# Patient Record
Sex: MALE | Race: WHITE | Employment: FULL TIME | URBAN - METROPOLITAN AREA
[De-identification: names, ages, dates, MRNs, and addresses within clinical notes are randomized per-mention and may not be internally consistent; named-entity substitution may affect disease eponyms.]

---

## 2018-07-02 PROBLEM — R07.9 CHEST PAIN: Status: ACTIVE | Noted: 2018-07-02

## 2019-01-24 ENCOUNTER — HOSPITAL ENCOUNTER (EMERGENCY)
Age: 54
Discharge: HOME | End: 2019-01-24
Payer: COMMERCIAL

## 2019-01-24 ENCOUNTER — APPOINTMENT (OUTPATIENT)
Dept: GENERAL RADIOLOGY | Age: 54
End: 2019-01-24
Payer: COMMERCIAL

## 2019-01-24 VITALS
BODY MASS INDEX: 32.81 KG/M2 | RESPIRATION RATE: 14 BRPM | HEIGHT: 75 IN | OXYGEN SATURATION: 97 % | HEART RATE: 60 BPM | WEIGHT: 263.89 LBS | DIASTOLIC BLOOD PRESSURE: 74 MMHG | TEMPERATURE: 98.4 F | SYSTOLIC BLOOD PRESSURE: 110 MMHG
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
## 2019-01-24 DIAGNOSIS — S20.211A CONTUSION OF RIGHT CHEST WALL, INITIAL ENCOUNTER: Primary | ICD-10-CM

## 2019-01-24 PROCEDURE — 99283 EMERGENCY DEPT VISIT LOW MDM: CPT

## 2019-01-24 PROCEDURE — 71046 X-RAY EXAM CHEST 2 VIEWS: CPT

## 2019-01-24 RX ORDER — TRAMADOL HYDROCHLORIDE 50 MG/1
50 TABLET ORAL
Status: ON HOLD | COMMUNITY
Start: 2018-11-01 | End: 2021-10-25 | Stop reason: ALTCHOICE

## 2019-01-24 RX ORDER — OXYCODONE HYDROCHLORIDE AND ACETAMINOPHEN 5; 325 MG/1; MG/1
1 TABLET ORAL EVERY 6 HOURS PRN
Qty: 12 TABLET | Refills: 0 | Status: SHIPPED | OUTPATIENT
Start: 2019-01-24 | End: 2019-01-27

## 2019-01-24 ASSESSMENT — ENCOUNTER SYMPTOMS
ABDOMINAL PAIN: 0
VOMITING: 0
NAUSEA: 0
SHORTNESS OF BREATH: 0
BACK PAIN: 0
SORE THROAT: 0

## 2019-01-24 ASSESSMENT — PAIN DESCRIPTION - PAIN TYPE: TYPE: ACUTE PAIN

## 2019-01-24 ASSESSMENT — PAIN DESCRIPTION - FREQUENCY: FREQUENCY: INTERMITTENT

## 2019-01-24 ASSESSMENT — PAIN DESCRIPTION - ORIENTATION: ORIENTATION: ANTERIOR;RIGHT

## 2019-01-24 ASSESSMENT — PAIN DESCRIPTION - LOCATION: LOCATION: RIB CAGE

## 2019-01-24 ASSESSMENT — PAIN SCALES - GENERAL: PAINLEVEL_OUTOF10: 2

## 2021-10-24 ENCOUNTER — APPOINTMENT (OUTPATIENT)
Dept: CT IMAGING | Age: 56
End: 2021-10-24
Payer: COMMERCIAL

## 2021-10-24 ENCOUNTER — HOSPITAL ENCOUNTER (OUTPATIENT)
Age: 56
Setting detail: OBSERVATION
Discharge: HOME | End: 2021-10-25
Attending: EMERGENCY MEDICINE | Admitting: INTERNAL MEDICINE
Payer: COMMERCIAL

## 2021-10-24 DIAGNOSIS — G45.9 TIA (TRANSIENT ISCHEMIC ATTACK): Primary | ICD-10-CM

## 2021-10-24 LAB
ANION GAP SERPL CALCULATED.3IONS-SCNC: 7 MMOL/L (ref 3–16)
BASOPHILS ABSOLUTE: 0.1 K/UL (ref 0–0.2)
BASOPHILS RELATIVE PERCENT: 0.7 %
BUN BLDV-MCNC: 9 MG/DL (ref 7–20)
CALCIUM SERPL-MCNC: 7.7 MG/DL (ref 8.3–10.6)
CHLORIDE BLD-SCNC: 108 MMOL/L (ref 99–110)
CO2: 24 MMOL/L (ref 21–32)
CREAT SERPL-MCNC: 0.8 MG/DL (ref 0.9–1.3)
EOSINOPHILS ABSOLUTE: 0.1 K/UL (ref 0–0.6)
EOSINOPHILS RELATIVE PERCENT: 1.1 %
GFR AFRICAN AMERICAN: >60
GFR NON-AFRICAN AMERICAN: >60
GLUCOSE BLD-MCNC: 89 MG/DL (ref 70–99)
HCT VFR BLD CALC: 42.1 % (ref 40.5–52.5)
HEMOGLOBIN: 14 G/DL (ref 13.5–17.5)
LYMPHOCYTES ABSOLUTE: 2.1 K/UL (ref 1–5.1)
LYMPHOCYTES RELATIVE PERCENT: 27.4 %
MAGNESIUM: 1.7 MG/DL (ref 1.8–2.4)
MCH RBC QN AUTO: 31.8 PG (ref 26–34)
MCHC RBC AUTO-ENTMCNC: 33.2 G/DL (ref 31–36)
MCV RBC AUTO: 95.9 FL (ref 80–100)
MONOCYTES ABSOLUTE: 0.8 K/UL (ref 0–1.3)
MONOCYTES RELATIVE PERCENT: 10.2 %
NEUTROPHILS ABSOLUTE: 4.7 K/UL (ref 1.7–7.7)
NEUTROPHILS RELATIVE PERCENT: 60.6 %
PDW BLD-RTO: 13.1 % (ref 12.4–15.4)
PLATELET # BLD: 262 K/UL (ref 135–450)
PMV BLD AUTO: 7 FL (ref 5–10.5)
POTASSIUM REFLEX MAGNESIUM: 3.4 MMOL/L (ref 3.5–5.1)
RBC # BLD: 4.39 M/UL (ref 4.2–5.9)
SODIUM BLD-SCNC: 139 MMOL/L (ref 136–145)
WBC # BLD: 7.8 K/UL (ref 4–11)

## 2021-10-24 PROCEDURE — 6370000000 HC RX 637 (ALT 250 FOR IP): Performed by: INTERNAL MEDICINE

## 2021-10-24 PROCEDURE — 70496 CT ANGIOGRAPHY HEAD: CPT

## 2021-10-24 PROCEDURE — 85025 COMPLETE CBC W/AUTO DIFF WBC: CPT

## 2021-10-24 PROCEDURE — 70450 CT HEAD/BRAIN W/O DYE: CPT

## 2021-10-24 PROCEDURE — 80048 BASIC METABOLIC PNL TOTAL CA: CPT

## 2021-10-24 PROCEDURE — 83735 ASSAY OF MAGNESIUM: CPT

## 2021-10-24 PROCEDURE — 6360000004 HC RX CONTRAST MEDICATION: Performed by: EMERGENCY MEDICINE

## 2021-10-24 PROCEDURE — 93005 ELECTROCARDIOGRAM TRACING: CPT | Performed by: PHYSICIAN ASSISTANT

## 2021-10-24 PROCEDURE — 36415 COLL VENOUS BLD VENIPUNCTURE: CPT

## 2021-10-24 PROCEDURE — 99284 EMERGENCY DEPT VISIT MOD MDM: CPT

## 2021-10-24 PROCEDURE — 6370000000 HC RX 637 (ALT 250 FOR IP): Performed by: PHYSICIAN ASSISTANT

## 2021-10-24 PROCEDURE — G0378 HOSPITAL OBSERVATION PER HR: HCPCS

## 2021-10-24 PROCEDURE — 2580000003 HC RX 258: Performed by: INTERNAL MEDICINE

## 2021-10-24 RX ORDER — SODIUM CHLORIDE 0.9 % (FLUSH) 0.9 %
10 SYRINGE (ML) INJECTION EVERY 12 HOURS SCHEDULED
Status: DISCONTINUED | OUTPATIENT
Start: 2021-10-24 | End: 2021-10-25 | Stop reason: HOSPADM

## 2021-10-24 RX ORDER — ASPIRIN 81 MG/1
81 TABLET ORAL DAILY
Status: DISCONTINUED | OUTPATIENT
Start: 2021-10-24 | End: 2021-10-25 | Stop reason: HOSPADM

## 2021-10-24 RX ORDER — ATORVASTATIN CALCIUM 40 MG/1
40 TABLET, FILM COATED ORAL NIGHTLY
Status: DISCONTINUED | OUTPATIENT
Start: 2021-10-24 | End: 2021-10-25 | Stop reason: HOSPADM

## 2021-10-24 RX ORDER — MAGNESIUM SULFATE 1 G/100ML
1000 INJECTION INTRAVENOUS PRN
Status: DISCONTINUED | OUTPATIENT
Start: 2021-10-24 | End: 2021-10-25 | Stop reason: HOSPADM

## 2021-10-24 RX ORDER — POTASSIUM CHLORIDE 7.45 MG/ML
10 INJECTION INTRAVENOUS PRN
Status: DISCONTINUED | OUTPATIENT
Start: 2021-10-24 | End: 2021-10-25 | Stop reason: HOSPADM

## 2021-10-24 RX ORDER — ACETAMINOPHEN 325 MG/1
650 TABLET ORAL ONCE
Status: COMPLETED | OUTPATIENT
Start: 2021-10-24 | End: 2021-10-24

## 2021-10-24 RX ORDER — SODIUM CHLORIDE 9 MG/ML
25 INJECTION, SOLUTION INTRAVENOUS PRN
Status: DISCONTINUED | OUTPATIENT
Start: 2021-10-24 | End: 2021-10-25 | Stop reason: HOSPADM

## 2021-10-24 RX ORDER — GLUCOSAMINE/D3/BOSWELLIA SERRA 1500MG-400
10000 TABLET ORAL DAILY
Status: DISCONTINUED | OUTPATIENT
Start: 2021-10-24 | End: 2021-10-24 | Stop reason: RX

## 2021-10-24 RX ORDER — ASPIRIN 300 MG/1
300 SUPPOSITORY RECTAL DAILY
Status: DISCONTINUED | OUTPATIENT
Start: 2021-10-24 | End: 2021-10-25 | Stop reason: HOSPADM

## 2021-10-24 RX ORDER — TRAMADOL HYDROCHLORIDE 50 MG/1
50 TABLET ORAL EVERY 4 HOURS PRN
Status: DISCONTINUED | OUTPATIENT
Start: 2021-10-24 | End: 2021-10-25 | Stop reason: HOSPADM

## 2021-10-24 RX ORDER — ONDANSETRON 4 MG/1
4 TABLET, ORALLY DISINTEGRATING ORAL EVERY 8 HOURS PRN
Status: DISCONTINUED | OUTPATIENT
Start: 2021-10-24 | End: 2021-10-25 | Stop reason: HOSPADM

## 2021-10-24 RX ORDER — ONDANSETRON 2 MG/ML
4 INJECTION INTRAMUSCULAR; INTRAVENOUS EVERY 6 HOURS PRN
Status: DISCONTINUED | OUTPATIENT
Start: 2021-10-24 | End: 2021-10-25 | Stop reason: HOSPADM

## 2021-10-24 RX ORDER — CITALOPRAM 20 MG/1
20 TABLET ORAL DAILY
Status: DISCONTINUED | OUTPATIENT
Start: 2021-10-24 | End: 2021-10-25 | Stop reason: HOSPADM

## 2021-10-24 RX ORDER — M-VIT,TX,IRON,MINS/CALC/FOLIC 27MG-0.4MG
1 TABLET ORAL DAILY
Status: DISCONTINUED | OUTPATIENT
Start: 2021-10-24 | End: 2021-10-25 | Stop reason: HOSPADM

## 2021-10-24 RX ORDER — SODIUM CHLORIDE 0.9 % (FLUSH) 0.9 %
10 SYRINGE (ML) INJECTION PRN
Status: DISCONTINUED | OUTPATIENT
Start: 2021-10-24 | End: 2021-10-25 | Stop reason: HOSPADM

## 2021-10-24 RX ORDER — POTASSIUM CHLORIDE 20 MEQ/1
40 TABLET, EXTENDED RELEASE ORAL PRN
Status: DISCONTINUED | OUTPATIENT
Start: 2021-10-24 | End: 2021-10-25 | Stop reason: HOSPADM

## 2021-10-24 RX ADMIN — ATORVASTATIN CALCIUM 40 MG: 40 TABLET, FILM COATED ORAL at 21:11

## 2021-10-24 RX ADMIN — ACETAMINOPHEN 650 MG: 325 TABLET ORAL at 12:37

## 2021-10-24 RX ADMIN — SODIUM CHLORIDE, PRESERVATIVE FREE 10 ML: 5 INJECTION INTRAVENOUS at 21:11

## 2021-10-24 RX ADMIN — ASPIRIN 81 MG: 81 TABLET, COATED ORAL at 21:11

## 2021-10-24 RX ADMIN — IOPAMIDOL 75 ML: 755 INJECTION, SOLUTION INTRAVENOUS at 13:40

## 2021-10-24 ASSESSMENT — ENCOUNTER SYMPTOMS
CHEST TIGHTNESS: 0
VOMITING: 0
GASTROINTESTINAL NEGATIVE: 1
SHORTNESS OF BREATH: 0
NAUSEA: 0

## 2021-10-24 ASSESSMENT — PAIN SCALES - GENERAL
PAINLEVEL_OUTOF10: 0
PAINLEVEL_OUTOF10: 4
PAINLEVEL_OUTOF10: 0

## 2021-10-24 NOTE — ED PROVIDER NOTES
[address]  [address] [address]  Dept: [address]: [phone]  eMERGENCYdEPARTMENT eNCOUnter      Pt Name: Elayne Kramer  MRN: [id number]  Jitendra 1965  Date of evaluation: 10/24/2021  Provider:Irlanda Wen PA-C    CHIEF COMPLAINT     No chief complaint on file. CRITICAL CARE TIME   Total Critical Care time was 10 minutes, excluding separately reportable procedures. There was a high probability of clinically significant/life threatening deterioration in the patient's condition which required my urgentintervention. HISTORY OF PRESENT ILLNESS  (Location/Symptom, Timing/Onset, Context/Setting, Quality, Duration,Modifying Factors, Severity.)   Elayne Kramer is a 55 y.o. male who presents to the emergency department by private vehicle. Patient states this morning he was filling his pillbox when he notices his \"left eye was doing funky things\". This first episode lasted about 2 minutes and resolved spontaneously. Short while later he began having recurrence of symptoms. At this time he noticed his left eye had drifted laterally and he could not move it. This episode lasted about 20 minutes and resolved spontaneously prior to arrival. He denies any trauma or injury to head/eyes, extremity numbness/tingling/weakness, speech abnormality, confusion, visual field loss or diplopia. States he currently has a mild headache. Denies any nausea or vomiting. Never had symptoms like this previously. Patient also states he had 3 seizures over the past year. States wife has witnessed episodes. Patient states he loses consciousness, his eyes rolled back of his head he has fine shaking, particularly to the upper portion of his body/head. He has never been seen or evaluated for these episodes. Nursing Notes were reviewedand agreed with or any disagreements were addressed in the HPI.     REVIEW OF SYSTEMS    (2-9 systems for level 4, 10 or more for level 5)     Review of Systems   Constitutional: Negative for chills and fever. HENT: Negative. Eyes: Positive for visual disturbance. Respiratory: Negative for chest tightness and shortness of breath. Cardiovascular: Negative. Gastrointestinal: Negative. Negative for nausea and vomiting. Genitourinary: Negative. Musculoskeletal: Negative for arthralgias and myalgias. Skin: Negative. Neurological: Negative for dizziness and light-headedness. Psychiatric/Behavioral: Negative for behavioral problems and confusion. Except as noted above the remainder of the review of systems was reviewed and negative. PAST MEDICAL HISTORY         Diagnosis Date    DVT of leg (deep venous thrombosis) ([facility] [address] 2017    right    Seizures ([facility] [address]        SURGICAL HISTORY           Procedure Laterality Date    LUMBAR FUSION      TONSILLECTOMY         CURRENT MEDICATIONS     [unfilled]    ALLERGIES     Sulfa antibiotics    FAMILY HISTORY     History reviewed. No pertinent family history. No family status information on file. SOCIAL HISTORY      reports that he has never smoked. He has never used smokeless tobacco. He reports that he does not drink alcohol and does not use drugs. PHYSICAL EXAM    (up to 7 for level 4, 8 or more for level 5)     ED Triage Vitals   Enc Vitals Group      BP 10/24/21 1315 132/88      Pulse 10/24/21 1315 80      Resp 10/24/21 1315 16      Temp --       Temp src --       SpO2 10/24/21 1145 96 %      Weight 10/24/21 1030 257 lb 15 oz (117 kg)      Height 10/24/21 1030 6' 5\" (1.956 m)      Head Circumference --       Peak Flow --       Pain Score --       Pain Loc --       Pain Edu? --       Excl. in [address]? --         Physical Exam  Vitals reviewed. Constitutional:       Appearance: Normal appearance. HENT:      Head: Normocephalic and atraumatic. Cardiovascular:      Rate and Rhythm: Normal rate and regular rhythm.    Pulmonary: Effort: Pulmonary effort is normal. No respiratory distress. Abdominal:      Palpations: Abdomen is soft. Tenderness: There is no abdominal tenderness. Musculoskeletal:         General: Normal range of motion. Cervical back: Normal range of motion and neck supple. Skin:     General: Skin is warm. Neurological:      General: No focal deficit present. Mental Status: He is alert and oriented to person, place, and time. Mental status is at baseline. Cranial Nerves: No cranial nerve deficit. Sensory: No sensory deficit. Motor: No weakness. Coordination: Coordination normal.      Gait: Gait normal.   Psychiatric:         Mood and Affect: Mood normal.         Behavior: Behavior normal.           DIAGNOSTIC RESULTS     EKG: All EKG's are interpreted by the Emergency Department Physician who either signs or Co-signs this chart in the absence of a cardiologist.    RADIOLOGY:   Non-plain film images such as CT, Ultrasound and MRI are read by the radiologist. Plain radiographic images are visualized and preliminarilyinterpreted by the emergency physician with the below findings:    Interpretation per the Radiologist below,if available at the time of this note:    CT HEAD WO CONTRAST   Final Result   No acute intracranial abnormality. CTA HEAD NECK W CONTRAST   Final Result   Unremarkable CTA of the head and neck without significant stenosis or   evidence for occlusion.                LABS:  Labs Reviewed   BASIC METABOLIC PANEL W/ REFLEX TO MG FOR LOW K - Abnormal; Notable for the following components:       Result Value    Potassium reflex Magnesium 3.4 (*)     CREATININE 0.8 (*)     Calcium 7.7 (*)     All other components within normal limits    Narrative:     Performed at:  [place] [address]   Phone [phone]   MAGNESIUM - Abnormal; Notable for the following components:    Magnesium 1.70 (*) All other components within normal limits    Narrative:     Performed at:  [facility]  [address] [place] [address]   Phone [phone]   CBC WITH AUTO DIFFERENTIAL    Narrative:     Performed at:  [facility]  [address] [place], [place] [address]   Phone [phone]   PROTIME-INR       All other labs were within normal range or not returned as of this dictation. EMERGENCY DEPARTMENT COURSE and DIFFERENTIAL DIAGNOSIS/MDM:   Vitals:    Vitals:    10/24/21 1430 10/24/21 1445 10/24/21 1500 10/24/21 1515   BP:  127/80 134/72 105/62   Pulse:       Resp:       SpO2: 97% 97% 97% 100%   Weight:       Height:           MDM     Patient presents to the ED with HPI noted above. Patient asymptomatic upon arrival. CT head without contrast and CTA head and neck obtained. CT head showed no acute intracranial abnormality, CTA head and neck unremarkable. Consultation made to neurology. Dr. Greta Santiago kindly spoke with me. Patient be admitted for further CVA work-up. Patient with no recurrence of symptoms throughout course of stay in the ED. Consultation made to hospitalist regarding admission. Dr. Binh Anand kindly admitted patient. CONSULTS:  IP CONSULT TO NEUROLOGY    PROCEDURES:  Procedures    FINAL IMPRESSION      1. TIA (transient ischemic attack)          DISPOSITION/PLAN   [unfilled]    PATIENT REFERRED TO:  No follow-up provider specified.     DISCHARGE MEDICATIONS:  New Prescriptions    No medications on file       (Please note that portions of this note were completed with a voice recognition program.  Efforts were made to edit the dictations but occasionally words are mis-transcribed.)    [address] [facility]          [address] [place] [address]  10/24/21 2440

## 2021-10-24 NOTE — ED TRIAGE NOTES
pt states that earlier this morning his L eye turned to the side and it lasted 2 minutes, it then happened again but lasted for about 20 minutes. He states he was still able to see out that eye but the vision was not from what was in front of him. Wife saw his eye was turned to the side, but it then went back to normal. He now states his vision is normal and nothing is wrong. He does complain of small headache.  Neuro assessment is normal.

## 2021-10-24 NOTE — H&P
Hospital Medicine History & Physical      PCP: Sonal Brown MD    Date of Admission: 10/24/2021    Chief Complaint:  Left eye deviation    History Of Present Illness:  Patient is a 79-year-old male with past medical history of DVT on Xarelto who presented to hospital for change in vision especially from the left eye, mentions he had deviation of his left eye, he had noticed changes in vision along with difficulty maintaining balance. It was witnessed by his domestic partner as well. Patient otherwise denied numbness tingling sensation nausea vomiting diarrhea constipation. Patient mentions his symptoms have resolved now. Denies any previous similar episodes in the past        Past Medical History:          Diagnosis Date    DVT of leg (deep venous thrombosis) ([facility] [address] 2017    right    Seizures ([facility])        Past Surgical History:          Procedure Laterality Date    LUMBAR FUSION      TONSILLECTOMY         Medications Prior to Admission:      Prior to Admission medications    Medication Sig Start Date End Date Taking? Authorizing Provider   traMADol (ULTRAM) 50 MG tablet Take 50 mg by mouth. . 11/1/18   Historical Provider, MD   Multiple Vitamins-Minerals (THERAPEUTIC MULTIVITAMIN-MINERALS) tablet Take 1 tablet by mouth daily    Historical Provider, MD   Biotin 13461 MCG TABS Take 10,000 mcg by mouth daily    Historical Provider, MD   rivaroxaban (XARELTO) 20 MG TABS tablet Take 20 mg by mouth daily (with breakfast)     Historical Provider, MD   citalopram (CELEXA) 20 MG tablet Take 20 mg by mouth daily    Historical Provider, MD       Allergies:  Sulfa antibiotics    Social History:      TOBACCO:   reports that he has never smoked. He has never used smokeless tobacco.  ETOH:   reports no history of alcohol use. Family History:       Reviewed in detail and non contributory      History reviewed. No pertinent family history. REVIEW OF SYSTEMS:   Pertinent positives as noted in the HPI.  All other systems reviewed and negative. PHYSICAL EXAM PERFORMED:    BP (!) 159/92   Pulse 65   Temp 98.8 °F (37.1 °C) (Oral)   Resp 16   Ht 6' 2\" (1.88 m)   Wt 257 lb 8 oz (116.8 kg)   SpO2 97%   BMI 33.06 kg/m²     General appearance:  No apparent distress, cooperative. HEENT:  Normal cephalic, atraumatic without obvious deformity. Conjunctivae/corneas clear. Neck: Supple, with full range of motion. No cervical lymphadenopathy  Respiratory:  Normal respiratory effort. Clear to auscultation, bilaterally without Rales/Wheezes/Rhonchi. Cardiovascular:  Regular rate and rhythm with normal S1/S2 without murmurs, rubs or gallops. Abdomen: Soft, non-tender, non-distended, normal bowel sounds. Musculoskeletal:  No edema noted bilaterally. No tenderness on palpation   Skin: no rash visible  Neurologic:  Neurologically intact without any focal sensory/motor deficits. grossly non-focal.  Psychiatric:  Alert and oriented, normal mood  Peripheral Pulses: +2 palpable, equal bilaterally       Labs:     Recent Labs     10/24/21  1234   WBC 7.8   HGB 14.0   HCT 42.1        Recent Labs     10/24/21  1302      K 3.4*      CO2 24   BUN 9   CREATININE 0.8*   CALCIUM 7.7*     No results for input(s): AST, ALT, BILIDIR, BILITOT, ALKPHOS in the last 72 hours. No results for input(s): INR in the last 72 hours. No results for input(s): Kathyrn Belch in the last 72 hours. Urinalysis:    No results found for: Cathlene Shank, BACTERIA, RBCUA, BLOODU, [address]    Radiology:       CT HEAD WO CONTRAST   Final Result   No acute intracranial abnormality. CTA HEAD NECK W CONTRAST   Final Result   Unremarkable CTA of the head and neck without significant stenosis or   evidence for occlusion.          MRI brain without contrast    (Results Pending)           Active Hospital Problems    Diagnosis Date Noted    TIA (transient ischemic attack) [G45.9] 10/24/2021         Patient is a 23-year-old male with past medical history of DVT on Xarelto who presented to hospital for change in vision especially from the left eye, mentions he had deviation of his left eye, he had noticed changes in vision along with difficulty maintaining balance. It was witnessed by his domestic partner as well. Patient otherwise denied numbness tingling sensation nausea vomiting diarrhea constipation. Patient mentions his symptoms have resolved now. Denies any previous similar episodes in the past    Assessment  Left eye deviation, diplopia-resolved, likely TIA  History of DVT on Xarelto    Plan  Will start aspirin, statin, MRI brain without contrast, consult neurology, stroke protocol  Resume home Xarelto including Celexa, tramadol  DVT prophylaxis-on Xarelto  Diet: ADULT DIET; Regular  Code Status: Full Code    PT/OT Eval Status: ordered    Dispo - pending clinical improvement       Charity Blackmon MD    The note was completed using EMR and Dragon dictation system. Every effort was made to ensure accuracy; however, inadvertent computerized transcription errors may be present. Thank you Ehsan Almodovar MD for the opportunity to be involved in this patient's care. If you have any questions or concerns please feel free to contact me at [phone].     Charity Blackmon MD

## 2021-10-24 NOTE — PROGRESS NOTES
Patient arrived to PCU via stretcher from emergency room. Pt is alert and oriented, ambulating in the room, gait is steady. Pt placed on telemetry monitor and verified with CMU. Pt educated on safety measures. Call light within reach. Pt also given stroke booklet.

## 2021-10-24 NOTE — ED PROVIDER NOTES
I have personally performed a face to face diagnostic evaluation on this patient. I have fully participated in the care of this patient. I have reviewed and agree with all pertinent clinical information including history, physical exam, diagnostic tests, and the plan. HPI: Saundra Multani presented with a complaint of diplopia and left eye paralysis with his eye fixed downward and out which lasted only a few moments earlier this morning. Patient also reports that over the last year he had 3 episodes that his wife describes as seizures. He has never followed up for the symptoms. He has never had an MRI. He denies chronic past medical history does not have a history of diabetes that he is aware of. Patient has no symptoms at this time. No prior history of stroke. For further history see SHANIKA note. Review of Systems: See SHANIKA note  Vital Signs: There were no vitals taken for this visit. Alert 55 y.o. male who does not appear toxic or acutely ill  HENT: Atraumatic, oral mucosa moist  Neck: Grossly normal ROM  Chest/Lungs: respiratory effort normal   Abdomen: Soft nontender  Extremities: 2+ radial bilaterally  Musculoskeletal: Grossly normal ROM  Skin: No palor or diaphoresis  Neuro: No nerves intact at this time. Extraocular motions intact. No abnormalities in strength or sensation. Pupils equal and reactive. Medical Decision Making and Plan:  Pertinent Labs & Imaging studies reviewed. (See SHANIKA chart for details)  I agree with assessment and plan. High concern for aneurysm and/or intracranial malignancy. Patient has normal neuro exam at this time. Vital signs otherwise stable and unremarkable. Patient does not have an active headache. Patient will require CTA and likely MRI. We will consult neurology. See SHANIKA note for further details and disposition.              Iraida Scott MD  10/24/21 2330

## 2021-10-25 ENCOUNTER — APPOINTMENT (OUTPATIENT)
Dept: MRI IMAGING | Age: 56
End: 2021-10-25
Payer: COMMERCIAL

## 2021-10-25 VITALS
TEMPERATURE: 98.4 F | SYSTOLIC BLOOD PRESSURE: 140 MMHG | OXYGEN SATURATION: 95 % | HEIGHT: 74 IN | HEART RATE: 62 BPM | WEIGHT: 257.72 LBS | BODY MASS INDEX: 33.07 KG/M2 | RESPIRATION RATE: 18 BRPM | DIASTOLIC BLOOD PRESSURE: 87 MMHG

## 2021-10-25 LAB
ANION GAP SERPL CALCULATED.3IONS-SCNC: 10 MMOL/L (ref 3–16)
BUN BLDV-MCNC: 10 MG/DL (ref 7–20)
C-REACTIVE PROTEIN: <3 MG/L (ref 0–5.1)
CALCIUM SERPL-MCNC: 8.9 MG/DL (ref 8.3–10.6)
CHLORIDE BLD-SCNC: 104 MMOL/L (ref 99–110)
CHOLESTEROL, TOTAL: 202 MG/DL (ref 0–199)
CO2: 25 MMOL/L (ref 21–32)
CREAT SERPL-MCNC: 1 MG/DL (ref 0.9–1.3)
EKG ATRIAL RATE: 60 BPM
EKG DIAGNOSIS: NORMAL
EKG P AXIS: 104 DEGREES
EKG P-R INTERVAL: 182 MS
EKG Q-T INTERVAL: 426 MS
EKG QRS DURATION: 132 MS
EKG QTC CALCULATION (BAZETT): 426 MS
EKG R AXIS: 57 DEGREES
EKG T AXIS: 36 DEGREES
EKG VENTRICULAR RATE: 60 BPM
ESTIMATED AVERAGE GLUCOSE: 114 MG/DL
GFR AFRICAN AMERICAN: >60
GFR NON-AFRICAN AMERICAN: >60
GLUCOSE BLD-MCNC: 101 MG/DL (ref 70–99)
HBA1C MFR BLD: 5.6 %
HCT VFR BLD CALC: 43.2 % (ref 40.5–52.5)
HDLC SERPL-MCNC: 39 MG/DL (ref 40–60)
HEMOGLOBIN: 14.6 G/DL (ref 13.5–17.5)
INR BLD: 1.13 (ref 0.88–1.12)
LDL CHOLESTEROL CALCULATED: 139 MG/DL
MAGNESIUM: 2 MG/DL (ref 1.8–2.4)
MCH RBC QN AUTO: 32.1 PG (ref 26–34)
MCHC RBC AUTO-ENTMCNC: 33.7 G/DL (ref 31–36)
MCV RBC AUTO: 95.1 FL (ref 80–100)
PDW BLD-RTO: 13.2 % (ref 12.4–15.4)
PLATELET # BLD: 268 K/UL (ref 135–450)
PMV BLD AUTO: 7.1 FL (ref 5–10.5)
POTASSIUM REFLEX MAGNESIUM: 4.5 MMOL/L (ref 3.5–5.1)
PROTHROMBIN TIME: 12.8 SEC (ref 9.9–12.7)
RBC # BLD: 4.54 M/UL (ref 4.2–5.9)
SEDIMENTATION RATE, ERYTHROCYTE: 7 MM/HR (ref 0–20)
SODIUM BLD-SCNC: 139 MMOL/L (ref 136–145)
TRIGL SERPL-MCNC: 119 MG/DL (ref 0–150)
TSH REFLEX FT4: 2.44 UIU/ML (ref 0.27–4.2)
VLDLC SERPL CALC-MCNC: 24 MG/DL
WBC # BLD: 8.1 K/UL (ref 4–11)

## 2021-10-25 PROCEDURE — 80048 BASIC METABOLIC PNL TOTAL CA: CPT

## 2021-10-25 PROCEDURE — A9577 INJ MULTIHANCE: HCPCS | Performed by: NURSE PRACTITIONER

## 2021-10-25 PROCEDURE — 94760 N-INVAS EAR/PLS OXIMETRY 1: CPT

## 2021-10-25 PROCEDURE — 2580000003 HC RX 258: Performed by: INTERNAL MEDICINE

## 2021-10-25 PROCEDURE — 84443 ASSAY THYROID STIM HORMONE: CPT

## 2021-10-25 PROCEDURE — 93010 ELECTROCARDIOGRAM REPORT: CPT | Performed by: INTERNAL MEDICINE

## 2021-10-25 PROCEDURE — 6360000002 HC RX W HCPCS: Performed by: INTERNAL MEDICINE

## 2021-10-25 PROCEDURE — 6360000004 HC RX CONTRAST MEDICATION: Performed by: NURSE PRACTITIONER

## 2021-10-25 PROCEDURE — 80061 LIPID PANEL: CPT

## 2021-10-25 PROCEDURE — 90686 IIV4 VACC NO PRSV 0.5 ML IM: CPT | Performed by: INTERNAL MEDICINE

## 2021-10-25 PROCEDURE — 85027 COMPLETE CBC AUTOMATED: CPT

## 2021-10-25 PROCEDURE — 36415 COLL VENOUS BLD VENIPUNCTURE: CPT

## 2021-10-25 PROCEDURE — 83036 HEMOGLOBIN GLYCOSYLATED A1C: CPT

## 2021-10-25 PROCEDURE — G0008 ADMIN INFLUENZA VIRUS VAC: HCPCS | Performed by: INTERNAL MEDICINE

## 2021-10-25 PROCEDURE — 6370000000 HC RX 637 (ALT 250 FOR IP): Performed by: INTERNAL MEDICINE

## 2021-10-25 PROCEDURE — 86140 C-REACTIVE PROTEIN: CPT

## 2021-10-25 PROCEDURE — G0378 HOSPITAL OBSERVATION PER HR: HCPCS

## 2021-10-25 PROCEDURE — 85610 PROTHROMBIN TIME: CPT

## 2021-10-25 PROCEDURE — 85652 RBC SED RATE AUTOMATED: CPT

## 2021-10-25 PROCEDURE — 83735 ASSAY OF MAGNESIUM: CPT

## 2021-10-25 PROCEDURE — 70553 MRI BRAIN STEM W/O & W/DYE: CPT

## 2021-10-25 RX ORDER — ATORVASTATIN CALCIUM 40 MG/1
40 TABLET, FILM COATED ORAL NIGHTLY
Qty: 30 TABLET | Refills: 3 | Status: SHIPPED | OUTPATIENT
Start: 2021-10-25

## 2021-10-25 RX ADMIN — GADOBENATE DIMEGLUMINE 20 ML: 529 INJECTION, SOLUTION INTRAVENOUS at 18:14

## 2021-10-25 RX ADMIN — SODIUM CHLORIDE, PRESERVATIVE FREE 10 ML: 5 INJECTION INTRAVENOUS at 09:49

## 2021-10-25 RX ADMIN — MULTIPLE VITAMINS W/ MINERALS TAB 1 TABLET: TAB at 09:47

## 2021-10-25 RX ADMIN — CITALOPRAM HYDROBROMIDE 20 MG: 20 TABLET ORAL at 09:47

## 2021-10-25 RX ADMIN — ATORVASTATIN CALCIUM 40 MG: 40 TABLET, FILM COATED ORAL at 19:09

## 2021-10-25 RX ADMIN — RIVAROXABAN 20 MG: 20 TABLET, FILM COATED ORAL at 09:46

## 2021-10-25 RX ADMIN — INFLUENZA A VIRUS A/VICTORIA/2570/2019 IVR-215 (H1N1) ANTIGEN (PROPIOLACTONE INACTIVATED), INFLUENZA A VIRUS A/CAMBODIA/E0826360/2020 IVR-224 (H3N2) ANTIGEN (PROPIOLACTONE INACTIVATED), INFLUENZA B VIRUS B/VICTORIA/705/2018 BVR-11 ANTIGEN (PROPIOLACTONE INACTIVATED), INFLUENZA B VIRUS B/PHUKET/3073/2013 BVR-1B ANTIGEN (PROPIOLACTONE INACTIVATED) 0.5 ML: 15; 15; 15; 15 INJECTION, SUSPENSION INTRAMUSCULAR at 19:11

## 2021-10-25 RX ADMIN — ASPIRIN 81 MG: 81 TABLET, COATED ORAL at 09:46

## 2021-10-25 NOTE — PROGRESS NOTES
Hospital Medicine Progress Note      Admit Date: 10/24/2021       CC: F/U for left eye deviation    HPI: Patient is a 69-year-old male with past medical history of DVT on Xarelto who presented to hospital for change in vision especially from the left eye, mentions he had deviation of his left eye, he had noticed changes in vision along with difficulty maintaining balance. It was witnessed by his domestic partner as well. Patient otherwise denied numbness tingling sensation nausea vomiting diarrhea constipation. Patient mentions his symptoms have resolved now. Denies any previous similar episodes in the past       Interval History/Subjective: doing well. No further deviation of eye. Back to normal. Awaiting MRI. -- not able to do until after 6pm tonight. Lipitor started yesterday. Resume xarelto on d/c. Ok per neuro for rest of workup on outpatient if MRI neg. Review of Systems:       The patient denied headaches, visual changes, LOC, SOB, CP, ABD pain, N/V/D, skin changes, new or worsening weakness or neuromuscular deficits. Comprehensive ROS negative except as mentioned above. Past Medical History:        Diagnosis Date    DVT of leg (deep venous thrombosis) ([facility] [address] 2017    right    Seizures ([facility] [address]        Past Surgical History:        Procedure Laterality Date    LUMBAR FUSION      TONSILLECTOMY         Allergies:  Sulfa antibiotics    Past medical and surgical history reviewed. Any changes have been noted. PHYSICAL EXAM:  BP (!) 152/93   Pulse 67   Temp 98.1 °F (36.7 °C) (Oral)   Resp 18   Ht 6' 2\" (1.88 m)   Wt 257 lb 11.5 oz (116.9 kg)   SpO2 97%   BMI 33.09 kg/m²       Intake/Output Summary (Last 24 hours) at 10/25/2021 1020  Last data filed at 10/25/2021 0958  Gross per 24 hour   Intake 480 ml   Output    Net 480 ml        General appearance:   No apparent distress, appears stated age. Cooperative. HEENT:  Normocephalic, atraumatic. PERRLA. EOMi.   Conjunctivae/corneas Medications:    Scheduled Meds:   sodium chloride flush  10 mL IntraVENous 2 times per day    aspirin  81 mg Oral Daily    Or    aspirin  300 mg Rectal Daily    atorvastatin  40 mg Oral Nightly    citalopram  20 mg Oral Daily    therapeutic multivitamin-minerals  1 tablet Oral Daily    rivaroxaban  20 mg Oral Daily with breakfast    influenza virus vaccine  0.5 mL IntraMUSCular Prior to discharge     Continuous Infusions:   sodium chloride       PRN Meds:.perflutren lipid microspheres, sodium chloride flush, sodium chloride, ondansetron **OR** ondansetron, traMADol, potassium chloride **OR** potassium alternative oral replacement **OR** potassium chloride, magnesium sulfate    Assessment & Plan:        Left eye deviation, transient left 3rd cranial nerve palsy likely microvascular infarction  - diplopia-resolved while in ER  - nonfocal exam   - hx DVT   - on xarelto - resume on d/c  - start ASA, statin  - MRI brain pending (planned for 6pm tonight)  - Consult Neurology  - stroke protocol  - can resume rest of neuro w/u as outpatient if MRI neg. (EEG)  - no strong opinion on ASA added to xarelto on d/c per neurology   - discharge order in if MRI neg, pt can d/c tonight. Hyperlipidemia  - cont home statin as before        Continue current regimen/therapies. Monitor. Adjust medical regimen as appropriate. Body mass index is 33.09 kg/m². The patient and / or the family were informed of the results of any tests, a time was given to answer questions, a plan was proposed and they agreed with plan. DVT ppx: xarelto    Diet: ADULT DIET;  Regular    Consults:  IP CONSULT TO NEUROLOGY  IP CONSULT TO PHARMACY  IP CONSULT TO NEUROLOGY  IP CONSULT TO CASE MANAGEMENT    DISPO/placement plan: pending    Code Status: Full Code      Zaira Brock, ARIELA - DAJA  10/25/21

## 2021-10-25 NOTE — DISCHARGE SUMMARY
Hospital Medicine Discharge Summary    Patient ID: Audie Dance      Patient's PCP: Davina Sylvester MD    Admit Date: 10/24/2021     Discharge Date:   10/25/21    Admitting Physician: Jammie Nguyen MD     Discharge Physician: ARIELA Lala - CNP       Discharge Diagnoses: Active Hospital Problems    Diagnosis Date Noted    TIA (transient ischemic attack) [G45.9] 10/24/2021       The patient was seen and examined on day of discharge and this discharge summary is in conjunction with any daily progress note from day of discharge. Disposition:  [x] Home  [] Home with home health [] Rehab [] Psych [] SNF  [] LTAC  [] Long term nursing home or group home [] Transfer to ICU  [] Transfer to PCU [] Other:    Hospital Course: Patient is a 55-year-old male with past medical history of DVT on Xarelto who presented to hospital for change in vision especially from the left eye, mentions he had deviation of his left eye, he had noticed changes in vision along with difficulty maintaining balance.  It was witnessed by his domestic partner as well. Emmanuelle Stewart otherwise denied numbness tingling sensation nausea vomiting diarrhea constipation.  Patient mentions his symptoms have resolved now.  Denies any previous similar episodes in the past  On my exam, patient was doing well. No further deviation of eye and states he is \"back to normal.\"       Lipitor started yesterday. Resume xarelto on d/c. Ok per neuro for rest of workup on outpatient. MRI showed no acute findings to warrant further inpatient needs.     Left eye deviation, transient left 3rd cranial nerve palsy likely microvascular infarction  - diplopia-resolved while in ER  - nonfocal exam   - hx DVT   - on xarelto - resume on d/c  - start ASA, statin  - MRI brain neg  - Consult Neurology  - stroke protocol  - can resume rest of neuro w/u as outpatient if MRI neg. (EEG)  - no strong opinion on ASA added to xarelto on d/c per neurology   - medically ready for d/c and f/u outpatient for any further neuro w/u     Hyperlipidemia  - cont home statin as before  Exam:     BP (!) 140/87   Pulse 62   Temp 98.4 °F (36.9 °C) (Oral)   Resp 18   Ht 6' 2\" (1.88 m)   Wt 257 lb 11.5 oz (116.9 kg)   SpO2 95%   BMI 33.09 kg/m²   General appearance: No apparent distress, appears stated age and cooperative. HEENT: Pupils equal, round, and reactive to light. Conjunctivae/corneas clear. Neck: Supple, with full range of motion. No jugular venous distention. Trachea midline. Respiratory:  Normal respiratory effort. Clear to auscultation, bilaterally without Rales/Wheezes/Rhonchi. Cardiovascular: Regular rate and rhythm with normal S1/S2 without murmurs, rubs or gallops. Abdomen: Soft, non-tender, non-distended with normal bowel sounds. Musculoskelatal: No clubbing, cyanosis or edema bilaterally. Full range of motion without deformity. Skin: Skin color, texture, turgor normal.  No rashes or lesions. Neurologic:  Neurovascularly intact without any focal sensory/motor deficits. Cranial nerves: II-XII intact, grossly non-focal.  Psychiatric: Alert and oriented, thought content appropriate, normal insight      Consults:     IP CONSULT TO NEUROLOGY  IP CONSULT TO PHARMACY  IP CONSULT TO NEUROLOGY  IP CONSULT TO CASE MANAGEMENT    Diagnostic tests: Imaging:  CT HEAD WO CONTRAST   Final Result   No acute intracranial abnormality.           CTA HEAD NECK W CONTRAST   Final Result   Unremarkable CTA of the head and neck without significant stenosis or   evidence for occlusion. MRI brain:No acute brain parenchymal abnormality. Labs:  For convenience and continuity at follow-up the following most recent labs are provided:      CBC:    Lab Results   Component Value Date    WBC 8.1 10/25/2021    HGB 14.6 10/25/2021    HCT 43.2 10/25/2021     10/25/2021       Renal:    Lab Results   Component Value Date     10/25/2021    K 4.5 10/25/2021     10/25/2021    CO2 25 10/25/2021    BUN 10 10/25/2021    CREATININE 1.0 10/25/2021    CALCIUM 8.9 10/25/2021           Discharge Instructions/Follow-up:  Start lipitor. F/u with PCP 1 wk from discharge and repeat lipid panel 1 yr. Resume xarelto on discharge. PCP/SNF to follow up: lipid panel repeat 10/2022    D/C condition: stable    Code status: full        Discharge Medications:     Current Discharge Medication List           Details   atorvastatin (LIPITOR) 40 MG tablet Take 1 tablet by mouth nightly  Qty: 30 tablet, Refills: 3              Details   Multiple Vitamins-Minerals (THERAPEUTIC MULTIVITAMIN-MINERALS) tablet Take 1 tablet by mouth daily      Biotin 10455 MCG TABS Take 10,000 mcg by mouth daily      rivaroxaban (XARELTO) 20 MG TABS tablet Take 20 mg by mouth daily (with breakfast)       citalopram (CELEXA) 20 MG tablet Take 20 mg by mouth daily             Time Spent on discharge is more than 30 mins in the examination, evaluation, counseling and review of medications and discharge plan. Signed:    ARIELA Gordon - CNP   10/25/2021      Thank you Rachel Fatima MD for the opportunity to be involved in this patient's care. If you have any questions or concerns please feel free to contact me at [phone].

## 2021-10-25 NOTE — PLAN OF CARE
Problem: HEMODYNAMIC STATUS  Goal: Patient has stable vital signs and fluid balance  10/25/2021 1818 by Sharmila Sears RN  Outcome: Ongoing     Problem: ACTIVITY INTOLERANCE/IMPAIRED MOBILITY  Goal: Mobility/activity is maintained at optimum level for patient  10/25/2021 1818 by Sharmila Sears RN  Outcome: Ongoing     Problem: COMMUNICATION IMPAIRMENT  Goal: Ability to express needs and understand communication  10/25/2021 1818 by Sharmila Sears RN  Outcome: Ongoing

## 2021-10-25 NOTE — PROGRESS NOTES
4 Eyes Skin Assessment     NAME:  Elayne Kramer  YOB: 1965  MEDICAL RECORD NUMBER:  [id number]    The patient is being assess for  Admission    I agree that 2 RN's have performed a thorough Head to Toe Skin Assessment on the patient. ALL assessment sites listed below have been assessed. Areas assessed by both nurses:    Head, Face, Ears, Shoulders, Back, Chest, Arms, Elbows, Hands, Sacrum. Buttock, Coccyx, Ischium and Legs. Feet and Heels        Does the Patient have a Wound?  No noted wound(s)       Magdi Prevention initiated:  NA   Wound Care Orders initiated:  NA    Pressure Injury (Stage 3,4, Unstageable, DTI, NWPT, and Complex wounds) if present place consult order under [de-identified] No    New and Established Ostomies if present place consult order under : NA    Nurse 1 eSignature: Electronically signed by Jaspreet Shay RN on 10/24/21 at 10:50 PM EDT    **SHARE this note so that the co-signing nurse is able to place an eSignature**    Nurse 2 eSignature: Electronically signed by Amina Canada RN on 10/25/21 at 5:22 AM EDT

## 2021-10-25 NOTE — PROGRESS NOTES
Occupational Therapy    OT order received and chart reviewed. Pt educated on role of OT services. Pt reports feeling back to baseline and declining therapy. Will sign off at this time.     Electronically signed by Usama Meyer OT on 10/25/2021 at 1:17 PM

## 2021-10-25 NOTE — PROGRESS NOTES
NAME:  Lashonda Valentino OF BIRTH:  1965  MEDICAL RECORD NUMBER:  [id number]  TODAYS DATE:  10/25/2021    Discussed personal risk factors for Stroke /TIA with patient/family, and ways to reduce the risk for a recurrent stroke. Patient's personal risk factors which were identified are:     [] Alcohol Abuse: check with your physician before any alcohol consumption. [] Atrial fibrillation: may cause blood clots. [] Drug Abuse: Seek help, talk with your doctor  [x] Clotting Disorder  [] Diabetes  [] Family history of stroke or heart disease  [x] High Blood Pressure/Hypertension: work with your physician.  [] High cholesterol: monitor cholesterol levels with your physician. [x] Overweight/Obesity: work with your physician for your ideal body weight.  [] Physical Inactivity: get regular exercise as directed by your physician. [] Personal history of previous TIA or stroke  [x] Poor Diet; decrease salt (sodium) in your diet, follow diet directed by physician. [] Smoking: Cigarette/Cigar: stop smoking. Advised pt. that you can reduce your risk for stroke/TIA by modifying/controlling the risk factors that you have. Pt.advised to take the medications as prescribed, which will be detailed in the discharge instructions, and to not stop taking them without consulting their physician. In addition, pt. advised to maintain a healthy diet, exercise regularly and to not smoke. [facility]'s Stroke treatment and prevention, Managing your recovery  notebook  provided and/or reviewed  with patient/family. The notebook includes, but not limited to, sections addressing warning signs & symptoms of a stroke, which are: sudden numbness or weakness especially on one side of the body, sudden confusion, difficulty speaking or understanding, sudden changes in vision, sudden dizziness or loss of balance/ coordination, sudden severe headache, syncope and seizure.   The need to call EMS ([phone]) immediately if signs & symptoms occur is emphasized . The notebook also provides education on Stroke community resources and stroke advocacy. The need for follow-up after discharge was highlighted with patient/family with them being able to repeat understanding of the importance of this.       Electronically signed by Cheri Aquino RN on 10/25/2021 at 4:27 AM

## 2021-10-25 NOTE — PROGRESS NOTES
Speech Language Pathology    Referral note per CVA r/o protocol. Chart reviewed and spoke with RN and pt. CT and CTA negative. Pt denies any deficits, including with swallowing, speech, language and cognition, and politely declines formal Clinical Swallow Eval and SLP Assessment at this time. ST will sign off. Pt was instructed to notify MD if status changes.     Stella Pereira MS, CCC-SLP #[id number]  Speech Language Pathologist

## 2021-10-25 NOTE — CONSULTS
Neurology Consult Note  Reason for Consult: CVA    Chief complaint: vision changes, eye deviation. Dr Jaylene Sweeney MD asked me to see Audie Dance in consultation for evaluation of CVA    History of Present Illness:  Audie Dance is a 55 y.o. male who presents with vision changes, eye deviation. I obtained my information via interview w/ the patient, supplemented by chart review. Around 0930 yesterday morning, the patient says he was looking down then sort of glanced up at a light that was close to him and his vision \"went wonky\" just for a few seconds. He has a difficult time describing his vision changes any further and thought maybe the light was just the culprit. A couple of minutes later he says he looked down then looked up and again his vision was altered though this time his wife noticed that his R eye was looking straight, his L was deviated down and out. No additional symptoms. On the way here he was holding a paper towel over his eye to see clearly checking periodically and after about 20-30 minutes he felt back to normal.    BP was normal.  CT head was w/out any acute findings. CTA head/neck unremarkable. Neurologically he was intact. Currently he says he remains back to baseline. He denies ever experiencing anything like this in the past.      No hx of headaches or migraines. He is managing upper cervical spine disease. He tells me that he has had a few spells in the past.  About 3 years ago he had two brief episodes both when having a purple nerd flurry (ice cream) where he felt like he was having a brain freeze, his eyes rolled back, and he had some head tremor. His wife was able to essentially snap him out of it. This past August at a concert he started having some abdominal pain when he leaned back, his eyes rolled back, and sort of was in and out of consciousness before coming to and vomiting. Never any tongue biting or incontinence.   No real confusion or disorientation afterwards. He is on anticoagulation for DVT he says related to factor II clotting disorder. Medical History:  Past Medical History:   Diagnosis Date    DVT of leg (deep venous thrombosis) ([address] 2017    right    Seizures (HCC)      Past Surgical History:   Procedure Laterality Date    LUMBAR FUSION      TONSILLECTOMY       Scheduled Meds:   sodium chloride flush  10 mL IntraVENous 2 times per day    aspirin  81 mg Oral Daily    Or    aspirin  300 mg Rectal Daily    atorvastatin  40 mg Oral Nightly    citalopram  20 mg Oral Daily    therapeutic multivitamin-minerals  1 tablet Oral Daily    rivaroxaban  20 mg Oral Daily with breakfast    influenza virus vaccine  0.5 mL IntraMUSCular Prior to discharge     Medications Prior to Admission:   Multiple Vitamins-Minerals (THERAPEUTIC MULTIVITAMIN-MINERALS) tablet, Take 1 tablet by mouth daily  Biotin 75100 MCG TABS, Take 10,000 mcg by mouth daily  rivaroxaban (XARELTO) 20 MG TABS tablet, Take 20 mg by mouth daily (with breakfast)   citalopram (CELEXA) 20 MG tablet, Take 20 mg by mouth daily    Allergies   Allergen Reactions    Sulfa Antibiotics Swelling     History reviewed. No pertinent family history. Social History     Tobacco Use   Smoking Status Never Smoker   Smokeless Tobacco Never Used     Social History     Substance and Sexual Activity   Drug Use No     Social History     Substance and Sexual Activity   Alcohol Use No     ROS:  Constitutional- No weight loss or fevers  Eyes- No diplopia. No photophobia. Ears/nose/throat- No dysphagia. No Dysarthria  Cardiovascular- No palpitations. No chest pain  Respiratory- No dyspnea. No Cough  Gastrointestinal- No Abdominal pain. No Vomiting. Genitourinary- No incontinence. No urinary retention  Musculoskeletal- No myalgia. No arthralgia  Skin- No rash. No easy bruising. Psychiatric- No depression. No anxiety  Endocrine- No diabetes. No thyroid issues.   Hematologic- No bleeding difficulty. No fatigue  Neurologic- No weakness. No Headache. Exam:  Blood pressure (!) 152/93, pulse 67, temperature 98.1 °F (36.7 °C), temperature source Oral, resp. rate 18, height 6' 2\" (1.88 m), weight 257 lb 11.5 oz (116.9 kg), SpO2 97 %. Constitutional    Vital signs: BP, HR, and RR reviewed   General alert, no distress, well-nourished  Eyes: unable to visualize the fundi  Cardiovascular: no peripheral edema. Psychiatric: cooperative with examination, no psychotic behavior noted. Neurologic  Mental status:   orientation to person, place, time, situation. General fund of knowledge grossly intact   Memory grossly intact   Attention intact as able to attend well to the exam     Language fluent in conversation   Comprehension intact; follows simple commands  Cranial nerves:   CN2: visual fields full   CN 3,4,6: extraocular muscles intact. Pupils are equal, round, reactive bilaterally. CN5: facial sensation symmetric   CN7: face symmetric without dysarthria  CN8: hearing grossly intact  CN9: palate elevated symmetrically  CN11: trap full strength on shoulder shrug  CN12: tongue midline with protrusion  Strength: good strength in all 4 extremities   Deep tendon reflexes: normal in all 4 extremities  Sensory: light touch intact in all 4 extremities. Cerebellar/coordination: finger nose finger normal without ataxia  Tone: normal in all 4 extremities  Gait: normal gait    Labs  Glucose 101  Na 139  K 4.5  Mg 2.00  BUN 10  Cr 1.0    ALT 26  AST 27    WBC 8.1K  Hg 14.6  Platelets 131    TSH Reflex FT4 - 2.44    HgA1c 5.6    Cholesterol, Total 202 (H)   HDL Cholesterol 39 (L)   LDL Calculated 139 (H)   Triglycerides 119   VLDL Cholesterol Calculated 24     Studies  CT head w/o 10/24/21, independently reviewed  No acute intracranial abnormality. CTA head/neck 10/24/21, independently reviewed  Unremarkable. Impression  1. Transient L third nerve palsy per report.   He currently has no focal neurologic deficits or abnormal EOMs. The etiology is unclear. Could be central versus peripheral, possibly related to ischemia versus other. Neuromuscular junction disorder could perhaps be on the differential as well. Neurovascular imaging is unremarkable, no aneurysm. 2.  DVT/blood clotting disorder. Chronically anticoagulated. 3.  Hyperlipidemia. 4.  Spells of uncertain etiology. Recommendations  1. MRI brain w/wo. 2.  Would recommend an EEG at some point, inpatient versus outpatient. 3.  If neuroimaging is unremarkable, may consider MG panel. 4.  Continue anticoagulation, statin for stroke prophylaxis. Don't have a strong opinion about the addition of asa at this point.       Jaydon Mace NP  [address] [place] [address] [id number] Neurology    A copy of this note was provided for Dr Georgie Mcardle, MD